# Patient Record
(demographics unavailable — no encounter records)

---

## 2025-02-07 NOTE — ASSESSMENT
[FreeTextEntry1] : The patient is specifically interested in body contouring for masculinization. However, they will need an in-person assessment for an exam to discuss their options. To proceed, they will need a mental letter of assessment.

## 2025-02-07 NOTE — HISTORY OF PRESENT ILLNESS
[Home] : at home, [unfilled] , at the time of the visit. [Medical Office: ([facility])___] : at the medical office located in  [Telehealth (audio & video)] : This visit was provided via telehealth using real-time 2-way audio visual technology. [Verbal consent obtained from patient] : the patient, [unfilled] [FreeTextEntry1] : 27yo transgender male designated female at birth (Ali, pronounced Ah-lie; he/him) presents for consultation for masculinizing body contouring. States his goals are to "have a straighter torso". The areas that contribute to their dysphoria are mostly his hips. Patient has been on T for 6 years. He underwent top surgery in 2019 at [facility]. Denies any family history of DVT/clots. Denies any personal or family history of issues with general anesthesia. He has never been pregnant before.  Patient works as a . Lives with partner and roommates who will be supportive and helpful after surgery. Endorses marijuana use. The patient agreed to avoid all marijuana smoking for 6 weeks before surgery and 6 weeks after surgery and to avoid all THC use for 2 weeks before and after surgery. Denies tobacco use, drinks some alcohol (1-2 drinks a few times a week), and denies any other recreational drug use. Patient denies any history of psychiatric hospitalization or ER admission.

## 2025-02-07 NOTE — PHYSICAL EXAM
[de-identified] : [place] [de-identified] : Normal respiratory effort. [de-identified] : Deferred.

## 2025-02-19 NOTE — HISTORY OF PRESENT ILLNESS
[FreeTextEntry1] : 25yo transgender male designated female at birth (Ali, pronounced Ah-lie; he/him) presents for consultation for masculinizing body contouring. States his goals are to "have a straighter torso". The areas that contribute to their dysphoria are mostly his hips. Patient has been on T for 6 years. He underwent top surgery in 2019 at [facility]. Denies any family history of DVT/clots. Denies any personal or family history of issues with general anesthesia. He has never been pregnant before.  Patient works as a . Lives with partner and roommates who will be supportive and helpful after surgery. Endorses marijuana use. The patient agreed to avoid all marijuana smoking for 6 weeks before surgery and 6 weeks after surgery and to avoid all THC use for 2 weeks before and after surgery. Denies tobacco use, drinks some alcohol (1-2 drinks a few times a week), and denies any other recreational drug use. Patient denies any history of psychiatric hospitalization or ER admission.

## 2025-02-19 NOTE — PHYSICAL EXAM
[de-identified] : NAD. BMI 25.3 [de-identified] : Normal respiratory effort. [de-identified] : Abdominal exam performed with a medical chaperone present (DM), Abd S/NT/ND. no palpable HSM, palpable small umbilical herniae. Waist circumference 80 cm [de-identified] : Moderate prominence of bilateral lateral flanks and upper hips. Flank/superior hip circumference 93 cm and lower lateral hip circumference is 98cm.

## 2025-02-19 NOTE — PHYSICAL EXAM
[de-identified] : NAD. BMI 25.3 [de-identified] : Normal respiratory effort. [de-identified] : Abdominal exam performed with a medical chaperone present (DM), Abd S/NT/ND. no palpable HSM, palpable small umbilical herniae. Waist circumference 80 cm [de-identified] : Moderate prominence of bilateral lateral flanks and upper hips. Flank/superior hip circumference 93 cm and lower lateral hip circumference is 98cm.

## 2025-02-19 NOTE — ASSESSMENT
[FreeTextEntry1] : The patient is specifically interested in bilateral liposuction to the flanks/upper hips, and lower abdomen with fat grafting to the lateral waist. He expresses understanding of the risks associated with surgery as listed above. Waist to hip ratio is 0.82. To proceed, they will need 1-2 letters of assessment in accordance with his insurance plan requirements.

## 2025-02-19 NOTE — HISTORY OF PRESENT ILLNESS
[FreeTextEntry1] : 27yo transgender male designated female at birth (Ali, pronounced Ah-lie; he/him) presents for consultation for masculinizing body contouring. States his goals are to "have a straighter torso". The areas that contribute to their dysphoria are mostly his hips. Patient has been on T for 6 years. He underwent top surgery in 2019 at [facility]. Denies any family history of DVT/clots. Denies any personal or family history of issues with general anesthesia. He has never been pregnant before.  Patient works as a . Lives with partner and roommates who will be supportive and helpful after surgery. Endorses marijuana use. The patient agreed to avoid all marijuana smoking for 6 weeks before surgery and 6 weeks after surgery and to avoid all THC use for 2 weeks before and after surgery. Denies tobacco use, drinks some alcohol (1-2 drinks a few times a week), and denies any other recreational drug use. Patient denies any history of psychiatric hospitalization or ER admission.